# Patient Record
Sex: FEMALE | Race: WHITE | NOT HISPANIC OR LATINO | URBAN - METROPOLITAN AREA
[De-identification: names, ages, dates, MRNs, and addresses within clinical notes are randomized per-mention and may not be internally consistent; named-entity substitution may affect disease eponyms.]

---

## 2023-12-31 ENCOUNTER — EMERGENCY (EMERGENCY)
Facility: HOSPITAL | Age: 38
LOS: 0 days | Discharge: HOME | End: 2023-12-31
Attending: STUDENT IN AN ORGANIZED HEALTH CARE EDUCATION/TRAINING PROGRAM
Payer: COMMERCIAL

## 2023-12-31 VITALS
HEART RATE: 71 BPM | WEIGHT: 132.28 LBS | RESPIRATION RATE: 18 BRPM | SYSTOLIC BLOOD PRESSURE: 129 MMHG | HEIGHT: 61 IN | DIASTOLIC BLOOD PRESSURE: 87 MMHG | TEMPERATURE: 97 F | OXYGEN SATURATION: 98 %

## 2023-12-31 VITALS
RESPIRATION RATE: 18 BRPM | SYSTOLIC BLOOD PRESSURE: 118 MMHG | DIASTOLIC BLOOD PRESSURE: 66 MMHG | HEART RATE: 60 BPM | OXYGEN SATURATION: 97 %

## 2023-12-31 DIAGNOSIS — R51.9 HEADACHE, UNSPECIFIED: ICD-10-CM

## 2023-12-31 DIAGNOSIS — I10 ESSENTIAL (PRIMARY) HYPERTENSION: ICD-10-CM

## 2023-12-31 DIAGNOSIS — R11.0 NAUSEA: ICD-10-CM

## 2023-12-31 PROCEDURE — 99283 EMERGENCY DEPT VISIT LOW MDM: CPT

## 2023-12-31 PROCEDURE — 99284 EMERGENCY DEPT VISIT MOD MDM: CPT

## 2023-12-31 RX ORDER — ACETAMINOPHEN 500 MG
650 TABLET ORAL ONCE
Refills: 0 | Status: COMPLETED | OUTPATIENT
Start: 2023-12-31 | End: 2023-12-31

## 2023-12-31 RX ORDER — CAPTOPRIL 12.5 MG/1
1 TABLET ORAL
Qty: 5 | Refills: 0
Start: 2023-12-31 | End: 2024-01-04

## 2023-12-31 RX ORDER — IBUPROFEN 200 MG
400 TABLET ORAL ONCE
Refills: 0 | Status: DISCONTINUED | OUTPATIENT
Start: 2023-12-31 | End: 2023-12-31

## 2023-12-31 RX ORDER — IBUPROFEN 200 MG
600 TABLET ORAL ONCE
Refills: 0 | Status: COMPLETED | OUTPATIENT
Start: 2023-12-31 | End: 2023-12-31

## 2023-12-31 RX ADMIN — Medication 600 MILLIGRAM(S): at 05:29

## 2023-12-31 RX ADMIN — Medication 650 MILLIGRAM(S): at 05:29

## 2023-12-31 NOTE — ED ADULT NURSE NOTE - NSFALLUNIVINTERV_ED_ALL_ED
Bed/Stretcher in lowest position, wheels locked, appropriate side rails in place/Call bell, personal items and telephone in reach/Instruct patient to call for assistance before getting out of bed/chair/stretcher/Non-slip footwear applied when patient is off stretcher/[place] to call system/Physically safe environment - no spills, clutter or unnecessary equipment/Purposeful proactive rounding/Room/bathroom lighting operational, light cord in reach Bed/Stretcher in lowest position, wheels locked, appropriate side rails in place/Call bell, personal items and telephone in reach/Instruct patient to call for assistance before getting out of bed/chair/stretcher/Non-slip footwear applied when patient is off stretcher/[place] to call system/Physically safe environment - no spills, clutter or unnecessary equipment/Purposeful proactive rounding/Room/bathroom lighting operational, light cord in reach

## 2023-12-31 NOTE — ED PROVIDER NOTE - NSFOLLOWUPINSTRUCTIONS_ED_ALL_ED_FT
Our Emergency Department Referral Coordinators will be reaching out to you in the next 24-48 hours from 9:00am to 5:00pm with a follow up appointment. Please expect a phone call from the hospital in that time frame. If you do not receive a call or if you have any questions or concerns, you can reach them at   [phone]    Headache    A headache is pain or discomfort felt around the head or neck area. The specific cause of a headache may not be found as there are many types including tension headaches, migraine headaches, and cluster headaches. Watch your condition for any changes. Things you can do to manage your pain include taking over the counter and prescription medications as instructed by your health care provider, lying down in a dark quiet room, limiting stress, getting regular sleep, and refraining from alcohol and tobacco products.    SEEK IMMEDIATE MEDICAL CARE IF YOU HAVE ANY OF THE FOLLOWING SYMPTOMS: fever, vomiting, stiff neck, loss of vision, problems with speech, muscle weakness, loss of balance, trouble walking, passing out, or confusion.    Hypertension    Hypertension, commonly called high blood pressure, is when the force of blood pumping through your arteries is too strong. Hypertension forces your heart to work harder to pump blood. Your arteries may become narrow or stiff. Having untreated or uncontrolled hypertension for a long period of time can cause heart attack, stroke, kidney disease, and other problems. If started on a medication, take exactly as prescribed by your health care professional. Maintain a healthy lifestyle and follow up with your primary care physician.    SEEK IMMEDIATE MEDICAL CARE IF YOU HAVE ANY OF THE FOLLOWING SYMPTOMS: severe headache, confusion, chest pain, abdominal pain, vomiting, or shortness of breath. Our Emergency Department Referral Coordinators will be reaching out to you in the next 24-48 hours from 9:00am to 5:00pm with a follow up appointment. Please expect a phone call from the hospital in that time frame. If you do not receive a call or if you have any questions or concerns, you can reach them at   [phone]    Headache    A headache is pain or discomfort felt around the head or neck area. The specific cause of a headache may not be found as there are many types including tension headaches, migraine headaches, and cluster headaches. Watch your condition for any changes. Things you can do to manage your pain include taking over the counter and prescription medications as instructed by your health care provider, lying down in a dark quiet room, limiting stress, getting regular sleep, and refraining from alcohol and tobacco products.    SEEK IMMEDIATE MEDICAL CARE IF YOU HAVE ANY OF THE FOLLOWING SYMPTOMS: fever, vomiting, stiff neck, loss of vision, problems with speech, muscle weakness, loss of balance, trouble walking, passing out, or confusion.    Hypertension    Hypertension, commonly called high blood pressure, is when the force of blood pumping through your arteries is too strong. Hypertension forces your heart to work harder to pump blood. Your arteries may become narrow or stiff. Having untreated or uncontrolled hypertension for a long period of time can cause heart attack, stroke, kidney disease, and other problems. If started on a medication, take exactly as prescribed by your health care professional. Maintain a healthy lifestyle and follow up with your primary care physician.    SEEK IMMEDIATE MEDICAL CARE IF YOU HAVE ANY OF THE FOLLOWING SYMPTOMS: severe headache, confusion, chest pain, abdominal pain, vomiting, or shortness of breath.

## 2023-12-31 NOTE — ED ADULT NURSE NOTE - CAS ELECT INFOMATION PROVIDED
Letter sent to patient stating: Your labs have been reviewed by your Transplant physician, no action required. Next labs due 5/16/22      ----- Message from Cornell Anton MD sent at 3/2/2022  3:19 PM CST -----  Results reviewed       DC instructions 16

## 2023-12-31 NOTE — ED PROVIDER NOTE - CLINICAL SUMMARY MEDICAL DECISION MAKING FREE TEXT BOX
39 y/o F HTN p/w HA and nausea today. [place] are visiting from the [place], no PMD here. Was on captopril in the past. Today felt unwell and developed frontal HA, so checked BP and it was elevated to 170s systolic. In the past when this has happened, her sxs improve with BP meds and ibuprofen. Not thunderclap HA, not currently nauseous, no abd pain, no CP, no hematuria, no focal weakness/numbness/tingling. Otherwise well.     Pt was given pain medications and will send rx for BP meds, refer to PMD. No signs of end organ damage on exam or history.      given good instructions when to return to ED and importance of f/u.  all incidental findings were discussed with pt as well. pt verbalized understanding. patient was given opportunity to ask questions.

## 2023-12-31 NOTE — ED PROVIDER NOTE - PATIENT PORTAL LINK FT
You can access the FollowMyHealth Patient Portal offered by [facility] by registering at the following website: [email or web address]. By joining [employer]’s FollowMyHealth portal, you will also be able to view your health information using other applications (apps) compatible with our system. You can access the FollowMyHealth Patient Portal offered by [facility] by registering at the following website: [email or web address]. By joining [employer]’s FollowMyHealth portal, you will also be able to view your health information using other applications (apps) compatible with our system.

## 2023-12-31 NOTE — ED PROVIDER NOTE - OBJECTIVE STATEMENT
38y F hx of HTN on captopril visiting from [place] (country), no pmd presenting for eval of Headache associated with HTN, which has happened in the past when her BP is elevated. Normally she takes ibuprofen and her HTN medications which improves the symptoms. Denies fevers, chills, chest pain, shortness of breath, nausea, vomiting. Says her headache has started to improve slightly. Denies blurry vision.

## 2023-12-31 NOTE — ED PROVIDER NOTE - PHYSICAL EXAMINATION
Vital Signs: I have reviewed the initial vital signs.  Constitutional: well-nourished, appears stated age, no acute distress  Cardiovascular: regular rate, regular rhythm, well-perfused extremities  Respiratory: unlabored respiratory effort, clear to auscultation bilaterally  Gastrointestinal: soft, non-tender abdomen,   NEURO: AAO x 3, normal speech, no facial asymmetry, no nystagmus, peripheral vision intact, sensory equal and intact.

## 2023-12-31 NOTE — ED ADULT TRIAGE NOTE - BMI (KG/M2)

## 2023-12-31 NOTE — ED ADULT TRIAGE NOTE - CHIEF COMPLAINT QUOTE
Her blood pressure was high this morning, feels nausea and has headache as per family. Her blood pressure was high this morning with nausea and headache as per family.

## 2024-07-11 NOTE — ED ADULT NURSE NOTE - BEFAST ARM SIDE DRIFT
[address] [address]                              CONSULTATION      PATIENT NAME: STEW MOYER               : 1964  MED REC NO: [id number]                      ROOM: 0219  ACCOUNT NO: [id number]                       ADMIT DATE: 07/10/2024  PROVIDER: Randolph Haywood MD    CARDIAC ELECTROPHYSIOLOGY CONSULTATION    CONSULT DATE: 2024      REASON FOR CONSULTATION:  Atrial fibrillation.    HISTORY OF PRESENT ILLNESS:  The patient is a 60-year-old woman with history of sinus bradycardia and paroxysmal atrial fibrillation, who was admitted with recurrence of palpitations on 07/10/2024.  She had originally undergone cryoablation for atrial fibrillation in 2017, but had fairly prompt recurrences of atrial fibrillation.  RF catheter ablation was planned, but this has not been pursued related to issues about insurance coverage.  Over the past several years, the patient reports episodes of atrial fibrillation, which are easy for her to identify.  She estimates that these occur about every 6-8 months and average about 8 hours in duration.  At the time of admission on 07/10/2024, ECG demonstrates atrial fibrillation with an average ventricular rate of 167 beats per minute.  She had spontaneous conversion to sinus rhythm prior to admission to the ECG telemetry unit.  Subsequent ECG from 07/10/2024 at 5 p.m. demonstrates sinus bradycardia at 47 beats per minute.  The P-wave amplitude is very small.  Most recent echo from 2020 demonstrates well-preserved left ventricular systolic function with ejection fraction measured at 53%.  No significant cardiac valvular disease was identified.  Chronic pharmacologic therapy has been avoided based on her underlying sinus bradycardia.    PAST MEDICAL HISTORY:    1. Sick sinus syndrome.  2. Paroxysmal atrial fibrillation.    MEDICATIONS:  At the time of admission include apixaban 5 
Consult Placed     Who: NATALEE cardiolgoy  Date:7/10/24  Time: 1835     Electronically signed by Hernandez Gramajo RN on 7/10/2024 at 6:35 PM     
Consult dictated # [id number]  
No

## 2025-02-26 NOTE — ED PROVIDER NOTE - CHIEF COMPLAINT
Lab result shows no significant changes. Please feel free to reach out to me with any questions or concerns.   Regards     The patient is a 38y Female complaining of hypertension.